# Patient Record
Sex: FEMALE | Race: WHITE | NOT HISPANIC OR LATINO | URBAN - METROPOLITAN AREA
[De-identification: names, ages, dates, MRNs, and addresses within clinical notes are randomized per-mention and may not be internally consistent; named-entity substitution may affect disease eponyms.]

---

## 2018-12-13 ENCOUNTER — TRANSCRIBE ORDERS (OUTPATIENT)
Dept: ADMINISTRATIVE | Facility: HOSPITAL | Age: 38
End: 2018-12-13

## 2018-12-13 DIAGNOSIS — Z12.31 VISIT FOR SCREENING MAMMOGRAM: Primary | ICD-10-CM

## 2019-01-02 ENCOUNTER — APPOINTMENT (OUTPATIENT)
Dept: MAMMOGRAPHY | Facility: HOSPITAL | Age: 39
End: 2019-01-02

## 2021-07-23 ENCOUNTER — TRANSCRIBE ORDERS (OUTPATIENT)
Dept: ADMINISTRATIVE | Facility: HOSPITAL | Age: 41
End: 2021-07-23

## 2021-07-23 DIAGNOSIS — Z80.3 FAMILY HISTORY OF MALIGNANT NEOPLASM OF BREAST: Primary | ICD-10-CM

## 2021-10-21 ENCOUNTER — TRANSCRIBE ORDERS (OUTPATIENT)
Dept: PHYSICAL THERAPY | Facility: CLINIC | Age: 41
End: 2021-10-21

## 2021-10-21 DIAGNOSIS — F80.9 COMMUNICATION DEFICIT: ICD-10-CM

## 2021-10-21 DIAGNOSIS — Z78.9 DECREASED ACTIVITIES OF DAILY LIVING (ADL): Primary | ICD-10-CM

## 2021-10-21 DIAGNOSIS — R29.898 IMPAIRED STRENGTH OF LOWER EXTREMITY: ICD-10-CM

## 2021-10-21 DIAGNOSIS — R41.89 ALTERATION IN COGNITION: Primary | ICD-10-CM

## 2021-10-21 DIAGNOSIS — Z74.09 MOBILITY IMPAIRED: Primary | ICD-10-CM

## 2021-10-21 DIAGNOSIS — R29.898 IMPAIRED STRENGTH OF UPPER EXTREMITY: ICD-10-CM

## 2021-11-11 ENCOUNTER — TREATMENT (OUTPATIENT)
Dept: PHYSICAL THERAPY | Facility: CLINIC | Age: 41
End: 2021-11-11

## 2021-11-11 DIAGNOSIS — R29.898 DECREASED GRIP STRENGTH OF RIGHT HAND: Primary | ICD-10-CM

## 2021-11-11 DIAGNOSIS — Z74.09 IMPAIRED FUNCTIONAL MOBILITY, BALANCE, GAIT, AND ENDURANCE: Primary | ICD-10-CM

## 2021-11-11 DIAGNOSIS — R27.8 IMPAIRED COORDINATION OF UPPER EXTREMITY: ICD-10-CM

## 2021-11-11 PROCEDURE — 97162 PT EVAL MOD COMPLEX 30 MIN: CPT | Performed by: PHYSICAL THERAPIST

## 2021-11-11 PROCEDURE — 97166 OT EVAL MOD COMPLEX 45 MIN: CPT | Performed by: OCCUPATIONAL THERAPIST
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
## 2021-12-03 ENCOUNTER — TREATMENT (OUTPATIENT)
Dept: PHYSICAL THERAPY | Facility: CLINIC | Age: 41
End: 2021-12-03

## 2021-12-03 DIAGNOSIS — R27.8 IMPAIRED COORDINATION OF UPPER EXTREMITY: ICD-10-CM

## 2021-12-03 DIAGNOSIS — R29.898 DECREASED GRIP STRENGTH OF RIGHT HAND: Primary | ICD-10-CM

## 2021-12-03 DIAGNOSIS — Z74.09 IMPAIRED FUNCTIONAL MOBILITY, BALANCE, GAIT, AND ENDURANCE: Primary | ICD-10-CM

## 2021-12-03 PROCEDURE — 97110 THERAPEUTIC EXERCISES: CPT | Performed by: PHYSICAL THERAPIST

## 2021-12-03 PROCEDURE — 97110 THERAPEUTIC EXERCISES: CPT | Performed by: OCCUPATIONAL THERAPIST

## 2021-12-03 NOTE — PROGRESS NOTES
"Occupational Therapy Daily Progress Note  Visit: 2  Date of Initial Visit: Type: THERAPY  Noted: 2021      Patient: Loli Suarez   : 1980  Diagnosis/ICD-10 Code:  Decreased  strength of right hand [R29.898]  Referring practitioner: Bharath Jacobo, *  Date of Initial Visit: Type: THERAPY  Noted: 2021  Today's Date: 12/3/2021  Patient seen for 2 sessions      Subjective:   Patient reports: OT asked pt how her exercises went from last session. Pt states, \"I haven't tried them\"  Pain: 0/10  Subjective Questionnaire: n/a  Clinical Progress: unchanged  Home Program Compliance: No  Treatment has included: therapeutic exercise    Subjective   Objective     OT Neuro          OT Exercises     Row Name 21 1336             Exercise 1    Exercise Name 1 neural priming, UE ROM/strengthening  -ST      Equipment 1 UE Ergometer  -ST      Time (Minutes) 1 4  -ST              Exercise 2    Exercise Name 2 OH press  -ST      Equipment 2 Dowel  -ST      Weights/Plates 2 3  -ST      Sets 2 2  -ST      Reps 2 15  -ST              Exercise 3    Exercise Name 3 CP  -ST      Equipment 3 Dowel  -ST      Weights/Plates 3 3  -ST      Sets 3 2  -ST      Reps 3 15  -ST              Exercise 4    Exercise Name 4 ALT R/L punching starting at 90degree level and working to OH  -ST      Equipment 4 Dumbell  -ST      Weights/Plates 4 2  -ST      Sets 4 2  -ST      Reps 4 10  -ST              Exercise 5    Exercise Name 5 wrist f/e  -ST      Equipment 5 Dowel  -ST      Weights/Plates 5 3  -ST      Sets 5 2  -ST      Reps 5 15  -ST              Exercise 6    Exercise Name 6 pronation/supination; flexi bar  -ST      Resistance 6 Red  -ST      Sets 6 2  -ST      Reps 6 10  -ST              Exercise 7    Exercise Name 7 wrist f/e; flex bar  -ST      Resistance 7 Red  -ST      Sets 7 2  -ST      Reps 7 10  -ST              Exercise 8    Exercise Name 8  strengthening  -ST      Equipment 8 Hand Gripper  -ST      " Resistance 8 Red; Green  -ST      Sets 8 2  -ST      Reps 8 15  -ST            User Key  (r) = Recorded By, (t) = Taken By, (c) = Cosigned By    Initials Name Provider Type    Arabella Loyd OTR Occupational Therapist                 Assessment & Plan     Assessment    Assessment details: Pt with poor overall activity tolerance and requiring several rest breaks throughout session. Requiring cuing to complete exercises correctly as weakness in RUE prevents fully shoulder and elbow extension.    Plan  Plan details: Continue POC/goals as est/        Visit Diagnoses:    ICD-10-CM ICD-9-CM   1. Decreased  strength of right hand  R29.898 729.89   2. Impaired coordination of upper extremity  R27.8 781.3             Timed:  Manual Therapy:    0     mins  39091;  Therapeutic Exercise:    38     mins  80787;     Neuromuscular Allen:    0    mins  20609;    Therapeutic Activity:     0     mins  11343;     Self-Care/ADL     0     mins  68302;   Sensory Int. Tech      0     mins 81669;  Ultrasound:     0     mins  97693;    Electrical Stimulation:    0     mins  26628 ( );    Untimed:  Electrical Stimulation:    0     mins  31423 ( );    Timed Treatment:   38   mins   Total Treatment:     38   mins    OT Signature: Arabella Montiel MS, OTR/L, CDP  [place] License #: [id number]

## 2021-12-05 NOTE — PROGRESS NOTES
Physical Therapy Daily Note  Visit: 2  Date of Initial Visit: Type: THERAPY  Noted: 2021    Patient: Loli Suarez   : 1980  Diagnosis/ICD-10 Code:  Impaired functional mobility, balance, gait, and endurance [Z74.09]  Referring practitioner: Bharath Jacobo, *  Date of Initial Visit: Type: THERAPY  Noted: 2021  Today's Date: 2021  Patient seen for 2 sessions      Subjective:   Patient reports: she has not done anything. She only lays in her bed.   Pain: 0/10  Clinical Progress: unchanged  Home Program Compliance: No  Treatment has included: therapeutic exercise    Objective   See Exercise, Manual, and Modality Logs for complete treatment.    PT Neuro          Assessment & Plan     Assessment    Assessment details: Patient reported to clinic today stating she has done nothing in 2 weeks except lay in her bed. She continue to abuse nicotine, denies any other illicit drug use. Patient performed all exercises, however she did require seated rest breaks due to fatigue. She was encouraged to perform the initial HEP that she was given on first visit as well as one new exercise given today. She did ask about dry needling, she could not tell me in what capacity she would like it used for. She was educated on out of pocket cost, as well as a prescription needed from physician.     Plan  Plan details: Patient to continue with PT services to improve gait, balance, strength, transfers and overall functional mobility.          Timed:  Manual Therapy:            0     mins  64890;  Therapeutic Exercise:    30    mins  51344;     Neuromuscular Allen:    0    mins  06649;    Therapeutic Activity:      0     mins  45110;     Gait Trainin    mins  10334;     Electrical Stimulation:    0    mins  94036 ( );     Untimed:  Canalith Repositioning techniques _0_ [id number]      Timed Treatment:   30   mins   Total Treatment:     30   mins      Delphine Obrien, PT, DPT, MSCS, CDP  [place]  License #: [id number]  Physical Therapist

## 2021-12-07 ENCOUNTER — TREATMENT (OUTPATIENT)
Dept: PHYSICAL THERAPY | Facility: CLINIC | Age: 41
End: 2021-12-07

## 2021-12-07 DIAGNOSIS — R29.898 DECREASED GRIP STRENGTH OF RIGHT HAND: Primary | ICD-10-CM

## 2021-12-07 DIAGNOSIS — R27.8 IMPAIRED COORDINATION OF UPPER EXTREMITY: ICD-10-CM

## 2021-12-07 DIAGNOSIS — Z74.09 IMPAIRED FUNCTIONAL MOBILITY, BALANCE, GAIT, AND ENDURANCE: Primary | ICD-10-CM

## 2021-12-07 PROCEDURE — 97110 THERAPEUTIC EXERCISES: CPT | Performed by: PHYSICAL THERAPIST

## 2021-12-07 PROCEDURE — 97110 THERAPEUTIC EXERCISES: CPT | Performed by: OCCUPATIONAL THERAPIST

## 2021-12-07 NOTE — PROGRESS NOTES
"Occupational Therapy Daily Progress Note  Visit: 3  Date of Initial Visit: Type: THERAPY  Noted: 2021      Patient: Loli Suaerz   : 1980  Diagnosis/ICD-10 Code:  Decreased  strength of right hand [R29.898]  Referring practitioner: Bharath Jacobo, *  Date of Initial Visit: Type: THERAPY  Noted: 2021  Today's Date: 2021  Patient seen for 3 sessions      Subjective:   Patient reports: \"I feel ok today.\"  Pain: 3/10 LBP  Subjective Questionnaire: n/a  Clinical Progress: unchanged  Home Program Compliance: NO  Treatment has included: therapeutic exercise    Subjective   Objective     OT Neuro          OT Exercises     Row Name 21 1337             Exercise 1    Exercise Name 1 neural priming with UE ROM and strengthening  -ST      Equipment 1 UE Ergometer  -ST      Time (Minutes) 1 6  -ST              Exercise 2    Exercise Name 2 OH press  -ST      Equipment 2 Dumbell  -ST      Weights/Plates 2 3  -ST      Sets 2 2  -ST      Reps 2 10  -ST              Exercise 3    Exercise Name 3 CP  -ST      Equipment 3 Dumbell  -ST      Weights/Plates 3 3  -ST      Sets 3 2  -ST      Reps 3 10  -ST              Exercise 4    Exercise Name 4 pronation/supination  -ST      Equipment 4 Dumbell  -ST      Weights/Plates 4 3  -ST      Sets 4 2  -ST      Reps 4 10  -ST              Exercise 5    Exercise Name 5 wrist f/e  -ST      Equipment 5 Dumbell  -ST      Weights/Plates 5 3  -ST      Sets 5 2  -ST      Reps 5 10  -ST              Exercise 6    Exercise Name 6 wrist f/e  -ST      Equipment 6 Theraband  -ST      Resistance 6 Red  -ST      Sets 6 1  -ST      Reps 6 10  -ST              Exercise 7    Exercise Name 7 shoulder abd/adduction  -ST      Equipment 7 Theraband  -ST      Resistance 7 Red  -ST      Sets 7 1  -ST      Reps 7 10  -ST              Exercise 8    Exercise Name 8 pincer strengthening; resistive clips  -ST      Resistance 8 Blue  -ST      Sets 8 1  -ST      Reps 8 10  -ST      "       User Key  (r) = Recorded By, (t) = Taken By, (c) = Cosigned By    Initials Name Provider Type    Arabella Loyd OTMADHU Occupational Therapist                 Assessment & Plan     Assessment    Assessment details: Progressed to 3# dumb bells to improve strength and activity tolerance. Pt with difficulty maintaining symmetry with RUE d/t weakness and requiring additional time and effort.     Plan  Plan details: Continue w/POC and goals as est.         Visit Diagnoses:    ICD-10-CM ICD-9-CM   1. Decreased  strength of right hand  R29.898 729.89   2. Impaired coordination of upper extremity  R27.8 781.3             Timed:  Manual Therapy:    0     mins  05944;  Therapeutic Exercise:    40     mins  81247;     Neuromuscular Allen:    0    mins  32247;    Therapeutic Activity:     0     mins  79446;     Self-Care/ADL     0     mins  33517;   Sensory Int. Tech      0     mins 92075;  Ultrasound:     0     mins  48969;    Electrical Stimulation:    0     mins  87037 ( );    Untimed:  Electrical Stimulation:    0     mins  92476 ( );    Timed Treatment:   40   mins   Total Treatment:     40   mins    OT Signature: Arabella Montiel MS, OTR/L, CDP  [place] License #: [id number]

## 2021-12-07 NOTE — PROGRESS NOTES
Physical Therapy Daily Note/Discharge Summary  Visit: 3  Date of Initial Visit: Type: THERAPY  Noted: 2021    Patient: Loli Suarez   : 1980  Diagnosis/ICD-10 Code:  Impaired functional mobility, balance, gait, and endurance [Z74.09]  Referring practitioner: Bharath Jacobo, *  Date of Initial Visit: Type: THERAPY  Noted: 2021  Today's Date: 2021  Patient seen for 3 sessions      Subjective:   Patient reports: she has done no exercises.   Pain: 0/10  Clinical Progress: unchanged  Home Program Compliance: No  Treatment has included: therapeutic exercise and neuromuscular re-education    Objective   See Exercise, Manual, and Modality Logs for complete treatment.    PT Neuro   Exercise 1  Exercise Name 1: Nu-Step  Equipment/Resistance 1: L5  Time: 8 min  Exercise 2  Exercise Name 2: standing hip /  Sets/Reps 2: 2/15  Exercise 3  Exercise Name 3: standing hip abd  Sets/Reps 3: 2/15  Exercise 4  Exercise Name 4: standing mini squats  Sets/Reps 4: 2/10  Exercise 5  Exercise Name 5: heel/toe lifts  Sets/Reps 5: 15  Exercise 6  Exercise Name 6: lateral stepping  Sets/Reps 6: 2 laps  Exercise 7  Exercise Name 7: backwards walking  Sets/Reps 7: 2 laps  Exercise 8  Exercise Name 8: zig zag walking  Sets/Reps 8: 2 laps    Assessment & Plan     Assessment    Assessment details: Patient is unable to tolerate entire treatment. She states she wishes to have a Mtn Dew. If she doesn't get one now she will go to the ER with a migraine. Pt has been non compliant with HEP. She will be discharged at this time due to non compliance.     Goals  Plan Goals: STG (2 visits)  1. Patient will report compliance with initial HEP. NOT MET  2. Patient will demonstrate ability to stand on each leg in SLS >/= 10 sec. NOT MET    LTG (4 visits)  1. Patient will be I with final HEP. NOT MET  2. Patient to improve GALICIA balance score to >/= 56 /56 to decrease client's risk of falls. NOT MET      Plan  Treatment plan  discussed with: patient  Plan details: Patient will be discharged at this time.         Timed:  Manual Therapy:            0     mins  85861;  Therapeutic Exercise:    30    mins  91540;     Neuromuscular Allen:    0    mins  88774;    Therapeutic Activity:      0     mins  10010;     Gait Trainin    mins  43209;     Electrical Stimulation:    0    mins  09333 ( );     Untimed:  Canalith Repositioning techniques _0_ 83532      Timed Treatment:   30   mins   Total Treatment:     30   mins      Delphine Obrien PT, DPT, MSCS, CDP  [place] License #: [id number]  Physical Therapist

## 2021-12-15 ENCOUNTER — DOCUMENTATION (OUTPATIENT)
Dept: PHYSICAL THERAPY | Facility: CLINIC | Age: 41
End: 2021-12-15

## 2021-12-15 DIAGNOSIS — R29.898 DECREASED GRIP STRENGTH OF RIGHT HAND: Primary | ICD-10-CM

## 2021-12-15 DIAGNOSIS — R27.8 IMPAIRED COORDINATION OF UPPER EXTREMITY: ICD-10-CM

## 2021-12-15 NOTE — PROGRESS NOTES
Occupational Therapy D/C Note  Patient: Loli Suarez   : 1980  Diagnosis/ICD-10 Code:  Decreased  strength of right hand [R29.898]  Referring practitioner: No ref. provider found  Date of Initial Visit: Type: THERAPY  Noted: 2021  Today's Date: 12/15/2021       Assessment & Plan     Assessment  Impairments: abnormal coordination, abnormal gait, activity intolerance, impaired balance, impaired physical strength, lacks appropriate home exercise program and safety issue  Functional Limitations: reaching behind back and unable to perform repetitive tasks  Assessment details: D/C per POC/goals as pt unable to f/u with scheduled appts  Prognosis: fair    Goals  Plan Goals:   Pt will score 30 seconds or less R HAND on the 9HPT to demonstrate improved accuracy and speed with fine motor coordination by 8 wks.      Pt will increase R  strength to 42 or more # by 8 wks to demonstrate improved strength and function for daily tasks.     Pt will be independent with hand strengthening HEP to increase independence with ADL/IADL performance tasks by 4 wks.    Pt will be independent with hand FMC HEP to increase independence with ADL/IADL performance tasks by 4 wks.         Plan  Planned therapy interventions: ADL retraining, balance/weight-bearing training, fine motor coordination training, flexibility, functional ROM exercises, home exercise program, motor coordination training, neuromuscular re-education, postural training, strengthening, stretching, therapeutic activities, transfer training and IADL retraining  Treatment plan discussed with: patient  Plan details: D/C        Visit Diagnoses:    ICD-10-CM ICD-9-CM   1. Decreased  strength of right hand  R29.898 729.89   2. Impaired coordination of upper extremity  R27.8 781.3             Timed:  Manual Therapy:    0     mins  36486;  Therapeutic Exercise:    0     mins  24542;     Neuromuscular Allen:    0    mins  45538;    Therapeutic Activity:     0      mins  82423;     Self-Care/ADL     0     mins  53408;   Sensory Int. Tech      0     mins 28349;  Ultrasound:     0     mins  61433;    Electrical Stimulation:    0     mins  71979 ( );    Untimed:  Electrical Stimulation:    0     mins  32927 ( );    Timed Treatment:   0   mins   Total Treatment:     0   mins    OT Signature: Arabella Montiel MS, OTR/L, CDP  [place] License #: [id number]

## 2022-08-29 ENCOUNTER — HOSPITAL ENCOUNTER (EMERGENCY)
Facility: HOSPITAL | Age: 42
Discharge: HOME | End: 2022-08-29
Attending: EMERGENCY MEDICINE | Admitting: EMERGENCY MEDICINE

## 2022-08-29 VITALS
OXYGEN SATURATION: 99 % | DIASTOLIC BLOOD PRESSURE: 57 MMHG | HEART RATE: 58 BPM | WEIGHT: 200 LBS | RESPIRATION RATE: 19 BRPM | HEIGHT: 67 IN | BODY MASS INDEX: 31.39 KG/M2 | TEMPERATURE: 98.6 F | SYSTOLIC BLOOD PRESSURE: 119 MMHG

## 2022-08-29 DIAGNOSIS — R41.82 ALTERED MENTAL STATUS, UNSPECIFIED ALTERED MENTAL STATUS TYPE: Primary | ICD-10-CM

## 2022-08-29 LAB
ALBUMIN SERPL-MCNC: 3.7 G/DL (ref 3.5–5.2)
ALBUMIN/GLOB SERPL: 1.2 G/DL
ALP SERPL-CCNC: 86 U/L (ref 39–117)
ALT SERPL W P-5'-P-CCNC: 47 U/L (ref 1–33)
AMPHET+METHAMPHET UR QL: NEGATIVE
AMPHETAMINES UR QL: NEGATIVE
ANION GAP SERPL CALCULATED.3IONS-SCNC: 9 MMOL/L (ref 5–15)
AST SERPL-CCNC: 33 U/L (ref 1–32)
BARBITURATES UR QL SCN: NEGATIVE
BASOPHILS # BLD AUTO: 0.04 10*3/MM3 (ref 0–0.2)
BASOPHILS NFR BLD AUTO: 0.4 % (ref 0–1.5)
BENZODIAZ UR QL SCN: NEGATIVE
BILIRUB SERPL-MCNC: 0.2 MG/DL (ref 0–1.2)
BILIRUB UR QL STRIP: NEGATIVE
BUN SERPL-MCNC: 8 MG/DL (ref 6–20)
BUN/CREAT SERPL: 9.9 (ref 7–25)
BUPRENORPHINE SERPL-MCNC: NEGATIVE NG/ML
CALCIUM SPEC-SCNC: 8.6 MG/DL (ref 8.6–10.5)
CANNABINOIDS SERPL QL: NEGATIVE
CHLORIDE SERPL-SCNC: 107 MMOL/L (ref 98–107)
CLARITY UR: CLEAR
CO2 SERPL-SCNC: 26 MMOL/L (ref 22–29)
COCAINE UR QL: NEGATIVE
COLOR UR: YELLOW
CREAT SERPL-MCNC: 0.81 MG/DL (ref 0.57–1)
D-LACTATE SERPL-SCNC: 0.9 MMOL/L (ref 0.5–2)
DEPRECATED RDW RBC AUTO: 52.1 FL (ref 37–54)
EGFRCR SERPLBLD CKD-EPI 2021: 94.8 ML/MIN/1.73
EOSINOPHIL # BLD AUTO: 0.38 10*3/MM3 (ref 0–0.4)
EOSINOPHIL NFR BLD AUTO: 3.4 % (ref 0.3–6.2)
ERYTHROCYTE [DISTWIDTH] IN BLOOD BY AUTOMATED COUNT: 15.2 % (ref 12.3–15.4)
ETHANOL BLD-MCNC: <10 MG/DL (ref 0–10)
GLOBULIN UR ELPH-MCNC: 3.1 GM/DL
GLUCOSE SERPL-MCNC: 74 MG/DL (ref 65–99)
GLUCOSE UR STRIP-MCNC: NEGATIVE MG/DL
HCT VFR BLD AUTO: 43.8 % (ref 34–46.6)
HGB BLD-MCNC: 14.5 G/DL (ref 12–15.9)
HGB UR QL STRIP.AUTO: NEGATIVE
IMM GRANULOCYTES # BLD AUTO: 0.03 10*3/MM3 (ref 0–0.05)
IMM GRANULOCYTES NFR BLD AUTO: 0.3 % (ref 0–0.5)
KETONES UR QL STRIP: NEGATIVE
LEUKOCYTE ESTERASE UR QL STRIP.AUTO: NEGATIVE
LYMPHOCYTES # BLD AUTO: 4.76 10*3/MM3 (ref 0.7–3.1)
LYMPHOCYTES NFR BLD AUTO: 43 % (ref 19.6–45.3)
MCH RBC QN AUTO: 30.9 PG (ref 26.6–33)
MCHC RBC AUTO-ENTMCNC: 33.1 G/DL (ref 31.5–35.7)
MCV RBC AUTO: 93.4 FL (ref 79–97)
METHADONE UR QL SCN: NEGATIVE
MONOCYTES # BLD AUTO: 0.7 10*3/MM3 (ref 0.1–0.9)
MONOCYTES NFR BLD AUTO: 6.3 % (ref 5–12)
NEUTROPHILS NFR BLD AUTO: 46.6 % (ref 42.7–76)
NEUTROPHILS NFR BLD AUTO: 5.17 10*3/MM3 (ref 1.7–7)
NITRITE UR QL STRIP: NEGATIVE
NRBC BLD AUTO-RTO: 0 /100 WBC (ref 0–0.2)
OPIATES UR QL: NEGATIVE
OXYCODONE UR QL SCN: NEGATIVE
PCP UR QL SCN: NEGATIVE
PH UR STRIP.AUTO: 6 [PH] (ref 5–8)
PLATELET # BLD AUTO: 226 10*3/MM3 (ref 140–450)
PMV BLD AUTO: 11.1 FL (ref 6–12)
POTASSIUM SERPL-SCNC: 4 MMOL/L (ref 3.5–5.2)
PROPOXYPH UR QL: NEGATIVE
PROT SERPL-MCNC: 6.8 G/DL (ref 6–8.5)
PROT UR QL STRIP: NEGATIVE
RBC # BLD AUTO: 4.69 10*6/MM3 (ref 3.77–5.28)
SODIUM SERPL-SCNC: 142 MMOL/L (ref 136–145)
SP GR UR STRIP: 1.01 (ref 1–1.03)
TRICYCLICS UR QL SCN: NEGATIVE
UROBILINOGEN UR QL STRIP: NORMAL
WBC NRBC COR # BLD: 11.08 10*3/MM3 (ref 3.4–10.8)

## 2022-08-29 PROCEDURE — 85025 COMPLETE CBC W/AUTO DIFF WBC: CPT | Performed by: PHYSICIAN ASSISTANT

## 2022-08-29 PROCEDURE — 82077 ASSAY SPEC XCP UR&BREATH IA: CPT | Performed by: PHYSICIAN ASSISTANT

## 2022-08-29 PROCEDURE — P9612 CATHETERIZE FOR URINE SPEC: HCPCS

## 2022-08-29 PROCEDURE — 99284 EMERGENCY DEPT VISIT MOD MDM: CPT

## 2022-08-29 PROCEDURE — 25010000002 NALOXONE PER 1 MG: Performed by: PHYSICIAN ASSISTANT

## 2022-08-29 PROCEDURE — 83605 ASSAY OF LACTIC ACID: CPT | Performed by: PHYSICIAN ASSISTANT

## 2022-08-29 PROCEDURE — 96374 THER/PROPH/DIAG INJ IV PUSH: CPT

## 2022-08-29 PROCEDURE — 80053 COMPREHEN METABOLIC PANEL: CPT | Performed by: PHYSICIAN ASSISTANT

## 2022-08-29 PROCEDURE — 36415 COLL VENOUS BLD VENIPUNCTURE: CPT

## 2022-08-29 PROCEDURE — 80306 DRUG TEST PRSMV INSTRMNT: CPT | Performed by: PHYSICIAN ASSISTANT

## 2022-08-29 PROCEDURE — 81003 URINALYSIS AUTO W/O SCOPE: CPT | Performed by: PHYSICIAN ASSISTANT

## 2022-08-29 RX ORDER — NALOXONE HCL 0.4 MG/ML
0.4 VIAL (ML) INJECTION ONCE
Status: COMPLETED | OUTPATIENT
Start: 2022-08-29 | End: 2022-08-29

## 2022-08-29 RX ORDER — BUPRENORPHINE HYDROCHLORIDE AND NALOXONE HYDROCHLORIDE DIHYDRATE 8; 2 MG/1; MG/1
2 TABLET SUBLINGUAL DAILY
COMMUNITY

## 2022-08-29 RX ORDER — CETIRIZINE HYDROCHLORIDE 10 MG/1
10 TABLET ORAL DAILY
COMMUNITY

## 2022-08-29 RX ORDER — MULTIPLE VITAMINS W/ MINERALS TAB 9MG-400MCG
1 TAB ORAL DAILY
COMMUNITY

## 2022-08-29 RX ORDER — QUETIAPINE FUMARATE 100 MG/1
100 TABLET, FILM COATED ORAL NIGHTLY
COMMUNITY

## 2022-08-29 RX ORDER — SUMATRIPTAN 25 MG/1
25 TABLET, FILM COATED ORAL 2 TIMES DAILY PRN
COMMUNITY

## 2022-08-29 RX ORDER — HYDROXYZINE HYDROCHLORIDE 25 MG/1
25 TABLET, FILM COATED ORAL 2 TIMES DAILY PRN
COMMUNITY

## 2022-08-29 RX ORDER — TOPIRAMATE 25 MG/1
25 TABLET ORAL NIGHTLY
COMMUNITY

## 2022-08-29 RX ADMIN — SODIUM CHLORIDE 1000 ML: 9 INJECTION, SOLUTION INTRAVENOUS at 16:40

## 2022-08-29 RX ADMIN — NALXONE HYDROCHLORIDE 0.4 MG: 0.4 INJECTION INTRAMUSCULAR; INTRAVENOUS; SUBCUTANEOUS at 16:40
